# Patient Record
Sex: MALE | Race: WHITE
[De-identification: names, ages, dates, MRNs, and addresses within clinical notes are randomized per-mention and may not be internally consistent; named-entity substitution may affect disease eponyms.]

---

## 2019-06-23 ENCOUNTER — HOSPITAL ENCOUNTER (EMERGENCY)
Dept: HOSPITAL 62 - ER | Age: 39
Discharge: HOME | End: 2019-06-23
Payer: COMMERCIAL

## 2019-06-23 VITALS — DIASTOLIC BLOOD PRESSURE: 76 MMHG | SYSTOLIC BLOOD PRESSURE: 147 MMHG

## 2019-06-23 DIAGNOSIS — F17.200: ICD-10-CM

## 2019-06-23 DIAGNOSIS — T78.49XA: ICD-10-CM

## 2019-06-23 DIAGNOSIS — Z91.013: ICD-10-CM

## 2019-06-23 DIAGNOSIS — M79.89: Primary | ICD-10-CM
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
## 2019-06-23 PROCEDURE — 99283 EMERGENCY DEPT VISIT LOW MDM: CPT

## 2019-06-23 NOTE — ER DOCUMENT REPORT
HPI





- HPI


Patient complains to provider of: swollen hands


Time Seen by Provider: 06/23/19 19:13


Pain Level: 2


Context: 





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 

presents the emergency department after coming in contact with seafood at work 

while wearing rubber gloves and having bilateral swelling of his hands.  He said

it happened 2 nights ago and he noticed that they were swollen.  He did have a 

little nausea at home that night and felt like he needed to vomit.  But never 

did.  Patient noticed that his knuckles were swollen.  Patient denies any hives,

denies any acute shortness of breath, denies the sensation of his throat 

swelling, currently denies any nausea/vomiting/diarrhea.  No other complaints





Past Medical History





- Social History


Smoking Status: Current Every Day Smoker


Family History: Reviewed & Not Pertinent





Vertical Provider Document





- CONSTITUTIONAL


Notes: 





PHYSICAL EXAMINATION:





Reviewed vital signs and charting by RN





GENERAL: Alert, interacts well. No acute distress.


HEAD: Normocephalic, atraumatic.


EYES: Pupils equal and round. Extraocular movements intact.


ENT: Oral mucosa moist, tongue midline. 


NECK: Full range of motion. Trachea midline.


EXTREMITIES: Moves all 4 extremities spontaneously.  Very mild bilateral edema 

of the hands without erythema or evidence of hives up to just proximal past the 

wrist, full range of motion,  strength 5/5, normal range of motion with 

flexion and extension of the wrists, patient states he does have some numbness 

of the fingers of the left hand,  No cyanosis.


PSYCH: Normal affect, normal mood.


SKIN: Warm, dry, normal turgor. No rashes or lesions noted.








- INFECTION CONTROL


TRAVEL OUTSIDE OF THE [place] IN LAST 30 DAYS: No





Course





- Re-evaluation


Re-evalutation: 





06/23/19 19:22


Patient presents with symptoms consistent with an allergic reaction without 

anaphylaxis.  Only cutaneous involvement with bilateral swelling of hands.  

Vitals otherwise within normal limits at time of arrival.  No respiratory, GI, 

cardiovascular, or oral pharyngeal symptoms.  A trial of Benadryl, Pepcid, 

prednisone given here in the emergency department for symptom resolution. Will 

recommend ongoing antihistamine therapy as an outpatient. At this time will 

discharge with return precautions and follow-up recommendations.  Verbal 

discharge instructions given a the bedside and opportunity for questions given. 

Medication warnings reviewed. Patient is in agreement with this plan and has 

verbalized understanding of return precautions and the need for primary care 

follow-up in the next 24-72 hours.





- Vital Signs


Vital signs: 


                                        











Temp Pulse Resp BP Pulse Ox


 


 98.7 F   60   16   147/76 H  97 


 


 06/23/19 17:55  06/23/19 17:55  06/23/19 17:55  06/23/19 17:55  06/23/19 17:55














Discharge





- Discharge


Clinical Impression: 


 Swelling of both hands





Condition: Good


Disposition: HOME, SELF-CARE


Additional Instructions: 


You were seen in the emergency department this evening for bilateral healing of 

the hands.  It could be related to contact with shrimp.  You have been given 

Benadryl, Pepcid, and prednisone here in the emergency department.  You were 

also given you a 5-day course of prednisone.  Please take the steroids until 

they are complete as they will help with this inflammation.  Please take 

Benadryl 25 mg every 8 hours for the next 2 to 3 days.  Please take the Zantac 

twice a day.  This should help with the symptoms.  If you develop anaphylactic 

type symptoms would like you experienced as a child, your throat starts to close

up, you are unable to breathe, you develop systemic hives, you have intractable 

nausea or vomiting, please immediately return to the emergency department as 

these are signs of anaphylaxis.


Prescriptions: 


Prednisone [Deltasone 20 mg Tablet] 3 tab PO DAILY 5 Days  tablet

## 2019-09-29 ENCOUNTER — HOSPITAL ENCOUNTER (EMERGENCY)
Dept: HOSPITAL 62 - ER | Age: 39
Discharge: HOME | End: 2019-09-29
Payer: SELF-PAY

## 2019-09-29 VITALS — DIASTOLIC BLOOD PRESSURE: 79 MMHG | SYSTOLIC BLOOD PRESSURE: 138 MMHG

## 2019-09-29 DIAGNOSIS — R11.2: Primary | ICD-10-CM

## 2019-09-29 DIAGNOSIS — F17.200: ICD-10-CM

## 2019-09-29 DIAGNOSIS — F12.90: ICD-10-CM

## 2019-09-29 DIAGNOSIS — R19.7: ICD-10-CM

## 2019-09-29 LAB
ADD MANUAL DIFF: NO
ALBUMIN SERPL-MCNC: 4.4 G/DL (ref 3.5–5)
ALP SERPL-CCNC: 62 U/L (ref 38–126)
ANION GAP SERPL CALC-SCNC: 8 MMOL/L (ref 5–19)
AST SERPL-CCNC: 24 U/L (ref 17–59)
BASOPHILS # BLD AUTO: 0.1 10^3/UL (ref 0–0.2)
BASOPHILS NFR BLD AUTO: 0.9 % (ref 0–2)
BILIRUB DIRECT SERPL-MCNC: 0.1 MG/DL (ref 0–0.4)
BILIRUB SERPL-MCNC: 0.4 MG/DL (ref 0.2–1.3)
BUN SERPL-MCNC: 10 MG/DL (ref 7–20)
CALCIUM: 9.8 MG/DL (ref 8.4–10.2)
CHLORIDE SERPL-SCNC: 105 MMOL/L (ref 98–107)
CO2 SERPL-SCNC: 27 MMOL/L (ref 22–30)
EOSINOPHIL # BLD AUTO: 0.2 10^3/UL (ref 0–0.6)
EOSINOPHIL NFR BLD AUTO: 2 % (ref 0–6)
ERYTHROCYTE [DISTWIDTH] IN BLOOD BY AUTOMATED COUNT: 13.5 % (ref 11.5–14)
GLUCOSE SERPL-MCNC: 87 MG/DL (ref 75–110)
HCT VFR BLD CALC: 48.1 % (ref 37.9–51)
HGB BLD-MCNC: 16.4 G/DL (ref 13.5–17)
LYMPHOCYTES # BLD AUTO: 2.5 10^3/UL (ref 0.5–4.7)
LYMPHOCYTES NFR BLD AUTO: 29.6 % (ref 13–45)
MCH RBC QN AUTO: 30.1 PG (ref 27–33.4)
MCHC RBC AUTO-ENTMCNC: 34.1 G/DL (ref 32–36)
MCV RBC AUTO: 88 FL (ref 80–97)
MONOCYTES # BLD AUTO: 0.6 10^3/UL (ref 0.1–1.4)
MONOCYTES NFR BLD AUTO: 6.6 % (ref 3–13)
NEUTROPHILS # BLD AUTO: 5.2 10^3/UL (ref 1.7–8.2)
NEUTS SEG NFR BLD AUTO: 60.9 % (ref 42–78)
PLATELET # BLD: 322 10^3/UL (ref 150–450)
POTASSIUM SERPL-SCNC: 4.1 MMOL/L (ref 3.6–5)
PROT SERPL-MCNC: 7.2 G/DL (ref 6.3–8.2)
RBC # BLD AUTO: 5.44 10^6/UL (ref 4.35–5.55)
TOTAL CELLS COUNTED % (AUTO): 100 %
WBC # BLD AUTO: 8.5 10^3/UL (ref 4–10.5)

## 2019-09-29 PROCEDURE — 85025 COMPLETE CBC W/AUTO DIFF WBC: CPT

## 2019-09-29 PROCEDURE — S0119 ONDANSETRON 4 MG: HCPCS

## 2019-09-29 PROCEDURE — 99284 EMERGENCY DEPT VISIT MOD MDM: CPT

## 2019-09-29 PROCEDURE — 36415 COLL VENOUS BLD VENIPUNCTURE: CPT

## 2019-09-29 PROCEDURE — 80053 COMPREHEN METABOLIC PANEL: CPT

## 2019-09-29 NOTE — ER DOCUMENT REPORT
HPI





- HPI


Time Seen by Provider: 09/29/19 15:50


Pain Level: Denies


Context: 





Patient is a 39-year-old male that comes to the emergency department for chief 

complaint of nausea, vomiting, and diarrhea since yesterday.  He has vomited 4 

times, had 6 episodes of watery diarrhea.  He denies hematemesis or 

hematochezia.  He denies fever but he did have some chills before.  She states 

she was given Zofran in triage and he has eaten since and he feels good now.  He

denies abdominal pain, flank pain, or any current symptoms.  He also states that

he has multiple family members including his kids with similar symptoms.  Denies

any daily medications.  He smokes, drinks infrequently, smokes weed, denies 

recreational drugs otherwise.  Denies recent antibiotics, foreign travel, or 

suspicious foods.





- REPRODUCTIVE


Reproductive: DENIES: Pregnant:





Past Medical History





- General


Information source: Patient





- Social History


Smoking Status: Current Every Day Smoker


Chew tobacco use (# tins/day): No


Smoking Education Provided: Yes - <3 min


Frequency of alcohol use: None


Drug Abuse: None, Marijuana


Lives with: Family


Family History: Reviewed & Not Pertinent


Patient has suicidal ideation: No


Patient has homicidal ideation: No


Renal/ Medical History: Denies: Hx Peritoneal Dialysis


Psychiatric Medical History: Reports: Hx Attention Deficit Hyperactivity 

Disorder





- Immunizations


Immunizations up to date: Yes


Hx Diphtheria, Pertussis, Tetanus Vaccination: Yes





Vertical Provider Document





- CONSTITUTIONAL


General Appearance: WD/WN, No Apparent Distress





- INFECTION CONTROL


TRAVEL OUTSIDE OF THE [place] IN LAST 30 DAYS: No





- HEENT


HEENT: Atraumatic, Normal ENT Exam, Normocephalic





- NECK


Neck: Normal Inspection





- RESPIRATORY


Respiratory: Breath Sounds Normal, No Respiratory Distress





- CARDIOVASCULAR


Cardiovascular: Regular Rate, Regular Rhythm





- GI/ABDOMEN


Gastrointestinal: Abdomen Soft, Abdomen Non-Tender.  negative: Abdomen Tender - 

Abdomen is completely nontender on my evaluation, no guarding, rigidity; normal 

bowel sounds





- BACK


Back: Normal Inspection





- MUSCULOSKELETAL/EXTREMETIES


Musculoskeletal/Extremeties: MAEW, FROM, Non-Tender





- NEURO


Level of Consciousness: Awake, Alert, Appropriate


Motor/Sensory: No Motor Deficit, No Sensory Deficit





- DERM


Integumentary: Warm, Dry, No Rash





Course





- Re-evaluation


Re-evalutation: 


Patient is extremely well-appearing, talkative, energetic.  He already ate 

before I examined him.  His belly is soft and benign.  He did not vomit.  He 

denies any complaints after Zofran.  Patient was sick family members.  I did 

review CBC and chemistry from triage and this was completely unremarkable.  

Provided with Zofran, discussed expectations, follow-up, and return precautions,

patient states appreciation and agreement.  Stable at time of discharge.





- Vital Signs


Vital signs: 


                                        











Temp Pulse Resp BP Pulse Ox


 


 98.7 F   81   16   138/79 H  96 


 


 09/29/19 14:39  09/29/19 14:39  09/29/19 14:39  09/29/19 14:39  09/29/19 14:39














- Laboratory


Result Diagrams: 


                                 09/29/19 16:55





                                 09/29/19 16:55





Discharge





- Discharge


Clinical Impression: 


 Nausea vomiting and diarrhea





Condition: Stable


Disposition: HOME, SELF-CARE


Additional Instructions: 


Your evaluation and workup are consistent with a viral gastroenteritis. No 

concerning findings are seen on your tests.


Rest, take the nausea medication if needed, start with bland food, hydrate. 

Symptoms should resolve with time. 


Follow up with primary care, follow up with the plastics referral for removal of

lipomas if desired (call the listed referral).


Return if you worsen (uncontrolled vomiting, severe abdominal pain, fevers, 

etc).


Prescriptions: 


Promethazine HCl [Phenergan 25 mg Tablet] 25 mg PO Q6H PRN #15 tablet


 PRN Reason: 


Forms:  Return to Work

## 2019-09-29 NOTE — ER DOCUMENT REPORT
ED Medical Screen (RME)





- General


Chief Complaint: Vomiting/Diarrhea


Stated Complaint: DIARRHEA


Time Seen by Provider: 09/29/19 15:50


Mode of Arrival: Ambulatory


Information source: Patient


Notes: 





Patient presents complaining of nausea vomiting diarrhea for the past 2 days.  

Patient states he is vomited about 4 times and had diarrhea about 6 times.  

Patient denies any abdominal pain unless he has diarrhea and then the pain 

resolves.  Patient denies any urinary symptoms.





I have greeted and performed a rapid initial assessment of this patient.  A 

comprehensive ED assessment and evaluation of the patient, analysis of test 

results and completion of the medical decision making process will be conducted 

by additional ED providers.


TRAVEL OUTSIDE OF THE [place] IN LAST 30 DAYS: No





- Related Data


Allergies/Adverse Reactions: 


                                        





iodine Allergy (Verified 09/29/19 15:48)


   


bees Allergy (Uncoded 09/29/19 15:48)


   


deodorant Allergy (Uncoded 09/29/19 15:48)


   


seafood Allergy (Uncoded 09/29/19 15:48)


   











Past Medical History


Renal/ Medical History: Denies: Hx Peritoneal Dialysis





Physical Exam





- Vital signs


Vitals: 





                                        











Temp Pulse Resp BP Pulse Ox


 


 98.7 F   81   16   138/79 H  96 


 


 09/29/19 14:39  09/29/19 14:39  09/29/19 14:39  09/29/19 14:39  09/29/19 14:39














- General


General appearance: Appears well, Alert


In distress: None





- Abdominal


Tenderness: Nontender





Course





- Vital Signs


Vital signs: 





                                        











Temp Pulse Resp BP Pulse Ox


 


 98.7 F   81   16   138/79 H  96 


 


 09/29/19 14:39  09/29/19 14:39  09/29/19 14:39  09/29/19 14:39  09/29/19 14:39